# Patient Record
Sex: FEMALE | URBAN - METROPOLITAN AREA
[De-identification: names, ages, dates, MRNs, and addresses within clinical notes are randomized per-mention and may not be internally consistent; named-entity substitution may affect disease eponyms.]

---

## 2023-11-28 ENCOUNTER — APPOINTMENT (RX ONLY)
Dept: URBAN - METROPOLITAN AREA CLINIC 55 | Facility: CLINIC | Age: 38
Setting detail: DERMATOLOGY
End: 2023-11-28

## 2023-11-28 DIAGNOSIS — Z41.9 ENCOUNTER FOR PROCEDURE FOR PURPOSES OTHER THAN REMEDYING HEALTH STATE, UNSPECIFIED: ICD-10-CM

## 2023-11-28 PROCEDURE — ? INVENTORY

## 2023-11-28 PROCEDURE — ? DYSPORT

## 2023-11-28 PROCEDURE — ? COSMETIC CONSULTATION: GENERAL

## 2023-11-29 ENCOUNTER — APPOINTMENT (RX ONLY)
Dept: URBAN - METROPOLITAN AREA CLINIC 55 | Facility: CLINIC | Age: 38
Setting detail: DERMATOLOGY
End: 2023-11-29

## 2023-11-29 DIAGNOSIS — Z41.9 ENCOUNTER FOR PROCEDURE FOR PURPOSES OTHER THAN REMEDYING HEALTH STATE, UNSPECIFIED: ICD-10-CM

## 2023-11-29 PROCEDURE — ? FILLERS

## 2023-11-29 PROCEDURE — ? INVENTORY

## 2023-11-29 NOTE — PROCEDURE: FILLERS
Vermilion Lips Filler Volume In Cc: 0
Include Cannula Information In Note?: No
Include Cannula Size?: 27G
Additional Anesthesia Volume In Cc: 6
Include Cannula Information In Note?: Yes
Aspiration Statement: Aspiration was performed prior to injecting site with filler.
Detail Level: Detailed
Filler: Restylane Contour
Anesthesia Type: 1% lidocaine with epinephrine
Consent: Written consent obtained. Risks include but not limited to bruising, beading, irregular texture, ulceration, infection, allergic reaction, scar formation, incomplete augmentation, temporary nature, and procedural pain.
Post-Care Instructions: After the procedure, patient instructed to apply ice to reduce swelling.
Anesthesia Volume In Cc: 0.5
Map Statment: See Attach Map for Complete Details

## 2024-07-30 ENCOUNTER — APPOINTMENT (RX ONLY)
Dept: URBAN - METROPOLITAN AREA CLINIC 55 | Facility: CLINIC | Age: 39
Setting detail: DERMATOLOGY
End: 2024-07-30

## 2024-07-30 DIAGNOSIS — Z41.9 ENCOUNTER FOR PROCEDURE FOR PURPOSES OTHER THAN REMEDYING HEALTH STATE, UNSPECIFIED: ICD-10-CM

## 2024-07-30 PROCEDURE — ? DYSPORT

## 2024-07-30 PROCEDURE — ? FILLERS

## 2024-07-30 PROCEDURE — ? INVENTORY

## 2024-07-30 NOTE — PROCEDURE: FILLERS
Brows Filler Volume In Cc: 0
Include Cannula Size?: 27G
Include Cannula Information In Note?: No
Detail Level: Detailed
Additional Anesthesia Volume In Cc: 6
Consent: Written consent obtained. Risks include but not limited to bruising, beading, irregular texture, ulceration, infection, allergic reaction, scar formation, incomplete augmentation, temporary nature, and procedural pain.
Include Cannula Information In Note?: Yes
Post-Care Instructions: After the procedure, patient instructed to apply ice to reduce swelling.
Filler: RHA 3
Aspiration Statement: Aspiration was performed prior to injecting site with filler.
Map Statment: See Attach Map for Complete Details
Anesthesia Type: 1% lidocaine with epinephrine
Anesthesia Volume In Cc: 0.5

## 2024-07-30 NOTE — PROCEDURE: DYSPORT
Show Masseter Units: Yes
Additional Area 1 Location: lip
Glabellar Complex Units: 25
Detail Level: Detailed
Forehead Units: 10
Show Right And Left Periorbital Units: No
Inferior Lateral Orbicularis Oculi Units: 0
Consent obtained. Risks include but not limited to lid/brow ptosis, bruising, swelling, diplopia, temporary effect, incomplete chemical denervation.
Post-Care Instructions: Patient instructed to not lie down for 4 hours and limit physical activity for 24 hours.
Show Inventory Tab: Show
Additional Area 2 Location: DLI
Additional Area 3 Location: Nasalis
Additional Area 4 Location: chin

## 2025-07-24 ENCOUNTER — APPOINTMENT (OUTPATIENT)
Dept: URBAN - METROPOLITAN AREA CLINIC 55 | Facility: CLINIC | Age: 40
Setting detail: DERMATOLOGY
End: 2025-07-24

## 2025-07-24 DIAGNOSIS — Z41.9 ENCOUNTER FOR PROCEDURE FOR PURPOSES OTHER THAN REMEDYING HEALTH STATE, UNSPECIFIED: ICD-10-CM

## 2025-07-24 PROCEDURE — ? INVENTORY

## 2025-07-24 PROCEDURE — ? DYSPORT

## 2025-07-24 NOTE — PROCEDURE: DYSPORT
R Brow Units: 0
Additional Area 3 Location: Nasalis
Show Ucl Units: No
Show Additional Area 3: Yes
Additional Area 1 Location: lip
Show Inventory Tab: Show
Consent obtained. Risks include but not limited to lid/brow ptosis, bruising, swelling, diplopia, temporary effect, incomplete chemical denervation.
Additional Area 4 Location: Chin
Additional Area 1 Units: 10
Additional Area 2 Location: DLI
Detail Level: Detailed
Post-Care Instructions: Patient instructed to not lie down for 4 hours and limit physical activity for 24 hours.
Glabellar Complex Units: 25
Back Pain